# Patient Record
Sex: FEMALE | Race: WHITE | NOT HISPANIC OR LATINO
[De-identification: names, ages, dates, MRNs, and addresses within clinical notes are randomized per-mention and may not be internally consistent; named-entity substitution may affect disease eponyms.]

---

## 2017-09-19 ENCOUNTER — RX ONLY (OUTPATIENT)
Age: 42
Setting detail: RX ONLY
End: 2017-09-19

## 2019-01-30 ENCOUNTER — OFFICE VISIT (OUTPATIENT)
Dept: URGENT CARE | Facility: CLINIC | Age: 44
End: 2019-01-30
Payer: COMMERCIAL

## 2019-01-30 VITALS
HEIGHT: 66 IN | DIASTOLIC BLOOD PRESSURE: 70 MMHG | WEIGHT: 250 LBS | TEMPERATURE: 97.9 F | SYSTOLIC BLOOD PRESSURE: 110 MMHG | BODY MASS INDEX: 40.18 KG/M2

## 2019-01-30 DIAGNOSIS — R10.9 LEFT FLANK PAIN: ICD-10-CM

## 2019-01-30 LAB
APPEARANCE UR: CLEAR
BILIRUB UR STRIP-MCNC: NORMAL MG/DL
COLOR UR AUTO: NORMAL
GLUCOSE UR STRIP.AUTO-MCNC: NORMAL MG/DL
KETONES UR STRIP.AUTO-MCNC: NORMAL MG/DL
LEUKOCYTE ESTERASE UR QL STRIP.AUTO: NORMAL
NITRITE UR QL STRIP.AUTO: NORMAL
PH UR STRIP.AUTO: 6 [PH] (ref 5–8)
PROT UR QL STRIP: NORMAL MG/DL
RBC UR QL AUTO: NORMAL
SP GR UR STRIP.AUTO: 1.02
UROBILINOGEN UR STRIP-MCNC: 0.2 MG/DL

## 2019-01-30 PROCEDURE — 81002 URINALYSIS NONAUTO W/O SCOPE: CPT | Performed by: NURSE PRACTITIONER

## 2019-01-30 PROCEDURE — 99203 OFFICE O/P NEW LOW 30 MIN: CPT | Performed by: NURSE PRACTITIONER

## 2019-01-30 RX ORDER — APIXABAN 5 MG/1
5 TABLET, FILM COATED ORAL 2 TIMES DAILY
COMMUNITY
Start: 2019-01-28

## 2019-01-30 ASSESSMENT — ENCOUNTER SYMPTOMS
VOMITING: 0
NAUSEA: 0
ABDOMINAL PAIN: 0
MYALGIAS: 0
WEAKNESS: 0
SHORTNESS OF BREATH: 0
FEVER: 0
PARESIS: 0
EYE PAIN: 0
FLANK PAIN: 1
PARESTHESIAS: 0
CHILLS: 0
PERIANAL NUMBNESS: 0
SORE THROAT: 0
NUMBNESS: 0
BACK PAIN: 1
BOWEL INCONTINENCE: 0
DIZZINESS: 0

## 2019-01-30 NOTE — PROGRESS NOTES
Subjective:     Annmarie Barahona is a 43 y.o. female who presents for Back Pain (Couple days Left lower back pain .)  Patient is a 43-year-old female presents clinic today for evaluation of worsening left-sided low back pain.  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  Patient denies any history of kidney stones.  Patient is followed by urology.  Patient denies any dysuria, urinary frequency, urinary hesitancy.  She denies any lower abdominal pain.  She denies any nausea, vomiting,, fever, chills.  Patient denies any mechanism of injury to the low back.     Back Pain   This is a new problem. Episode onset: 2 days. The problem occurs constantly. The problem has been rapidly worsening since onset. Pain location: Left flank. The quality of the pain is described as aching. The pain does not radiate. The pain is at a severity of 10/10. The pain is severe. The pain is the same all the time. Exacerbated by: Palpation. Stiffness is present all day. Pertinent negatives include no abdominal pain, bladder incontinence, bowel incontinence, chest pain, dysuria, fever, numbness, paresis, paresthesias, pelvic pain, perianal numbness or weakness. Risk factors: Factor VIII deficiency, previous kidney thrombosis, nephrectomy. She has tried analgesics for the symptoms. The treatment provided no relief.     PMH:  has no past medical history on file.  MEDS:   Current Outpatient Prescriptions:   •  metFORMIN (GLUCOPHAGE) 500 MG Tab, Take 500 mg by mouth 2 times a day, with meals., Disp: , Rfl:   •  ELIQUIS 5 MG Tab, Take 5 mg by mouth 2 Times a Day., Disp: , Rfl:   •  sertraline (ZOLOFT) 50 MG Tab, Take 50 mg by mouth every day., Disp: , Rfl: 3  •  ORTHO-NOVUM 1/35 (28) PO, QDAY. , Disp: , Rfl:   ALLERGIES:   Allergies   Allergen Reactions   • Codeine    •  "Hydrocodone-Acetaminophen      SURGHX:   Past Surgical History:   Procedure Laterality Date   • NEPHRECTOMY LAPAROSCOPIC Right      SOCHX:  reports that she has never smoked. She has never used smokeless tobacco. She reports that she does not drink alcohol or use drugs.  FH: Family history was reviewed, no pertinent findings to report       Review of Systems   Constitutional: Negative for chills and fever.   HENT: Negative for sore throat.    Eyes: Negative for pain.   Respiratory: Negative for shortness of breath.    Cardiovascular: Negative for chest pain.   Gastrointestinal: Negative for abdominal pain, bowel incontinence, nausea and vomiting.   Genitourinary: Positive for flank pain. Negative for bladder incontinence, dysuria, frequency, hematuria, pelvic pain and urgency.   Musculoskeletal: Positive for back pain. Negative for myalgias.   Skin: Negative for rash.   Neurological: Negative for dizziness, weakness, numbness and paresthesias.     Allergies   Allergen Reactions   • Codeine    • Hydrocodone-Acetaminophen       Objective:   /70 (BP Location: Left arm, Patient Position: Sitting, BP Cuff Size: Adult)   Temp 36.6 °C (97.9 °F) (Temporal)   Ht 1.664 m (5' 5.5\")   Wt 113.4 kg (250 lb)   BMI 40.97 kg/m²   Physical Exam   Constitutional: She is oriented to person, place, and time. She appears well-developed and well-nourished. No distress.   HENT:   Head: Normocephalic and atraumatic.   Right Ear: Tympanic membrane normal.   Left Ear: Tympanic membrane normal.   Nose: Nose normal. Right sinus exhibits no maxillary sinus tenderness and no frontal sinus tenderness. Left sinus exhibits no maxillary sinus tenderness and no frontal sinus tenderness.   Mouth/Throat: Uvula is midline, oropharynx is clear and moist and mucous membranes are normal. No posterior oropharyngeal edema, posterior oropharyngeal erythema or tonsillar abscesses. No tonsillar exudate.   Eyes: Pupils are equal, round, and reactive to " light. Conjunctivae and EOM are normal. Right eye exhibits no discharge. Left eye exhibits no discharge.   Cardiovascular: Normal rate and regular rhythm.    No murmur heard.  Pulmonary/Chest: Effort normal and breath sounds normal. No respiratory distress.   Abdominal: Soft. Bowel sounds are normal. She exhibits no distension. There is no tenderness. There is CVA tenderness (Left side).   Musculoskeletal:        Lumbar back: She exhibits normal range of motion, no tenderness, no bony tenderness, no pain and no spasm.        Back:    Neurological: She is alert and oriented to person, place, and time. She has normal reflexes. No sensory deficit.   Skin: Skin is warm, dry and intact.   Psychiatric: She has a normal mood and affect.         Assessment/Plan:   Assessment    1. Left flank pain  POCT Urinalysis   Urinalysis positive; RBCs, ketones    Patient is a 43-year-old female who presents clinic today for evaluation of left sided low back pain.  It appears patient has severe left-sided CVA tenderness.  Patient has a significant history of nephrectomy of the right due to a thrombosis of the right kidney patient does have factor VIII deficiency for which she does take Eliquis.  Patient is in severe amount of pain visually uncomfortable in exam room.  Concerned that patient will need stat imaging and stat labs due to history and presentation.  Discussed differentials with patient patient opting to go to the emergency room for full evaluation.  Patient will go to [facility] at this time for complete evaluation.  Patient has been directed to [facility] ER for further management/work up, I have reiterated to patient that although a provider to provider transfer was made this will not necessarily expedite the ER process.

## 2025-01-13 ENCOUNTER — HOSPITAL ENCOUNTER (OUTPATIENT)
Dept: RADIOLOGY | Facility: MEDICAL CENTER | Age: 50
End: 2025-01-13

## 2025-02-05 ENCOUNTER — HOSPITAL ENCOUNTER (OUTPATIENT)
Facility: MEDICAL CENTER | Age: 50
End: 2025-02-05
Attending: SURGERY | Admitting: SURGERY
Payer: COMMERCIAL

## 2025-02-05 DIAGNOSIS — Z01.810 PRE-OPERATIVE CARDIOVASCULAR EXAMINATION: ICD-10-CM

## 2025-02-05 DIAGNOSIS — Z01.812 PRE-OPERATIVE LABORATORY EXAMINATION: ICD-10-CM

## 2025-02-07 ENCOUNTER — APPOINTMENT (OUTPATIENT)
Dept: ADMISSIONS | Facility: MEDICAL CENTER | Age: 50
End: 2025-02-07
Attending: SURGERY
Payer: COMMERCIAL

## 2025-02-13 ENCOUNTER — PRE-ADMISSION TESTING (OUTPATIENT)
Dept: ADMISSIONS | Facility: MEDICAL CENTER | Age: 50
End: 2025-02-13
Attending: SURGERY
Payer: COMMERCIAL

## 2025-02-13 RX ORDER — LEVONORGESTREL 52 MG/1
1 INTRAUTERINE DEVICE INTRAUTERINE ONCE
COMMUNITY

## 2025-02-13 NOTE — PREADMIT AVS NOTE
Current Medications   Medication Instructions    levonorgestrel (MIRENA, 52 MG,) 20 MCG/DAY IUD Continue as prescribed    ELIQUIS 5 MG Tab Follow instructions from surgeon or specialist.    sertraline (ZOLOFT) 50 MG Tab Continue taking medication as prescribed, including morning of procedure

## 2025-02-24 ENCOUNTER — APPOINTMENT (OUTPATIENT)
Dept: ADMISSIONS | Facility: MEDICAL CENTER | Age: 50
End: 2025-02-24
Attending: SURGERY
Payer: COMMERCIAL

## 2025-02-24 DIAGNOSIS — Z01.810 PRE-OPERATIVE CARDIOVASCULAR EXAMINATION: ICD-10-CM

## 2025-02-24 DIAGNOSIS — Z01.812 PRE-OPERATIVE LABORATORY EXAMINATION: ICD-10-CM

## 2025-02-24 LAB
ANION GAP SERPL CALC-SCNC: 11 MMOL/L (ref 7–16)
BUN SERPL-MCNC: 7 MG/DL (ref 8–22)
CALCIUM SERPL-MCNC: 8.8 MG/DL (ref 8.5–10.5)
CHLORIDE SERPL-SCNC: 105 MMOL/L (ref 96–112)
CO2 SERPL-SCNC: 24 MMOL/L (ref 20–33)
CREAT SERPL-MCNC: 0.92 MG/DL (ref 0.5–1.4)
EKG IMPRESSION: NORMAL
ERYTHROCYTE [DISTWIDTH] IN BLOOD BY AUTOMATED COUNT: 47.8 FL (ref 35.9–50)
GFR SERPLBLD CREATININE-BSD FMLA CKD-EPI: 76 ML/MIN/1.73 M 2
GLUCOSE SERPL-MCNC: 104 MG/DL (ref 65–99)
HCT VFR BLD AUTO: 46.2 % (ref 37–47)
HGB BLD-MCNC: 14.8 G/DL (ref 12–16)
MCH RBC QN AUTO: 28.7 PG (ref 27–33)
MCHC RBC AUTO-ENTMCNC: 32 G/DL (ref 32.2–35.5)
MCV RBC AUTO: 89.5 FL (ref 81.4–97.8)
PLATELET # BLD AUTO: 281 K/UL (ref 164–446)
PMV BLD AUTO: 11.3 FL (ref 9–12.9)
POTASSIUM SERPL-SCNC: 3.9 MMOL/L (ref 3.6–5.5)
RBC # BLD AUTO: 5.16 M/UL (ref 4.2–5.4)
SODIUM SERPL-SCNC: 140 MMOL/L (ref 135–145)
WBC # BLD AUTO: 9.5 K/UL (ref 4.8–10.8)

## 2025-02-24 PROCEDURE — 93005 ELECTROCARDIOGRAM TRACING: CPT | Mod: TC | Performed by: SURGERY

## 2025-02-24 PROCEDURE — 36415 COLL VENOUS BLD VENIPUNCTURE: CPT

## 2025-02-24 PROCEDURE — 93010 ELECTROCARDIOGRAM REPORT: CPT | Performed by: INTERNAL MEDICINE

## 2025-02-24 PROCEDURE — 80048 BASIC METABOLIC PNL TOTAL CA: CPT

## 2025-02-24 PROCEDURE — 85027 COMPLETE CBC AUTOMATED: CPT

## 2025-02-28 ENCOUNTER — ANESTHESIA EVENT (OUTPATIENT)
Dept: SURGERY | Facility: MEDICAL CENTER | Age: 50
End: 2025-02-28
Payer: COMMERCIAL

## 2025-03-03 ENCOUNTER — ANESTHESIA (OUTPATIENT)
Dept: SURGERY | Facility: MEDICAL CENTER | Age: 50
End: 2025-03-03
Payer: COMMERCIAL

## 2025-03-03 VITALS
WEIGHT: 271.17 LBS | SYSTOLIC BLOOD PRESSURE: 137 MMHG | OXYGEN SATURATION: 97 % | TEMPERATURE: 96.6 F | RESPIRATION RATE: 18 BRPM | BODY MASS INDEX: 45.18 KG/M2 | HEART RATE: 72 BPM | DIASTOLIC BLOOD PRESSURE: 60 MMHG | HEIGHT: 65 IN

## 2025-03-03 LAB — HCG UR QL: NEGATIVE

## 2025-03-03 PROCEDURE — 700101 HCHG RX REV CODE 250: Performed by: STUDENT IN AN ORGANIZED HEALTH CARE EDUCATION/TRAINING PROGRAM

## 2025-03-03 PROCEDURE — 160015 HCHG STAT PREOP MINUTES: Performed by: SURGERY

## 2025-03-03 PROCEDURE — 160041 HCHG SURGERY MINUTES - EA ADDL 1 MIN LEVEL 4: Performed by: SURGERY

## 2025-03-03 PROCEDURE — 700111 HCHG RX REV CODE 636 W/ 250 OVERRIDE (IP): Mod: JZ | Performed by: PHYSICIAN ASSISTANT

## 2025-03-03 PROCEDURE — 160048 HCHG OR STATISTICAL LEVEL 1-5: Performed by: SURGERY

## 2025-03-03 PROCEDURE — 160002 HCHG RECOVERY MINUTES (STAT): Performed by: SURGERY

## 2025-03-03 PROCEDURE — 700102 HCHG RX REV CODE 250 W/ 637 OVERRIDE(OP): Performed by: STUDENT IN AN ORGANIZED HEALTH CARE EDUCATION/TRAINING PROGRAM

## 2025-03-03 PROCEDURE — 81025 URINE PREGNANCY TEST: CPT

## 2025-03-03 PROCEDURE — 160035 HCHG PACU - 1ST 60 MINS PHASE I: Performed by: SURGERY

## 2025-03-03 PROCEDURE — 160046 HCHG PACU - 1ST 60 MINS PHASE II: Performed by: SURGERY

## 2025-03-03 PROCEDURE — 160009 HCHG ANES TIME/MIN: Performed by: SURGERY

## 2025-03-03 PROCEDURE — A9270 NON-COVERED ITEM OR SERVICE: HCPCS | Performed by: STUDENT IN AN ORGANIZED HEALTH CARE EDUCATION/TRAINING PROGRAM

## 2025-03-03 PROCEDURE — 160029 HCHG SURGERY MINUTES - 1ST 30 MINS LEVEL 4: Performed by: SURGERY

## 2025-03-03 PROCEDURE — 700105 HCHG RX REV CODE 258: Performed by: SURGERY

## 2025-03-03 PROCEDURE — 160025 RECOVERY II MINUTES (STATS): Performed by: SURGERY

## 2025-03-03 PROCEDURE — 700111 HCHG RX REV CODE 636 W/ 250 OVERRIDE (IP): Performed by: STUDENT IN AN ORGANIZED HEALTH CARE EDUCATION/TRAINING PROGRAM

## 2025-03-03 PROCEDURE — 700101 HCHG RX REV CODE 250: Performed by: SURGERY

## 2025-03-03 RX ORDER — DIPHENHYDRAMINE HYDROCHLORIDE 50 MG/ML
12.5 INJECTION, SOLUTION INTRAMUSCULAR; INTRAVENOUS
Status: DISCONTINUED | OUTPATIENT
Start: 2025-03-03 | End: 2025-03-03 | Stop reason: HOSPADM

## 2025-03-03 RX ORDER — CELECOXIB 200 MG/1
200 CAPSULE ORAL ONCE
Status: COMPLETED | OUTPATIENT
Start: 2025-03-03 | End: 2025-03-03

## 2025-03-03 RX ORDER — BUPIVACAINE HYDROCHLORIDE AND EPINEPHRINE 5; 5 MG/ML; UG/ML
INJECTION, SOLUTION PERINEURAL
Status: DISCONTINUED | OUTPATIENT
Start: 2025-03-03 | End: 2025-03-03 | Stop reason: HOSPADM

## 2025-03-03 RX ORDER — ROCURONIUM BROMIDE 10 MG/ML
INJECTION, SOLUTION INTRAVENOUS PRN
Status: DISCONTINUED | OUTPATIENT
Start: 2025-03-03 | End: 2025-03-03 | Stop reason: SURG

## 2025-03-03 RX ORDER — METHOCARBAMOL 100 MG/ML
1000 INJECTION, SOLUTION INTRAMUSCULAR; INTRAVENOUS EVERY 8 HOURS PRN
Status: DISCONTINUED | OUTPATIENT
Start: 2025-03-03 | End: 2025-03-03 | Stop reason: HOSPADM

## 2025-03-03 RX ORDER — SODIUM CHLORIDE, SODIUM LACTATE, POTASSIUM CHLORIDE, CALCIUM CHLORIDE 600; 310; 30; 20 MG/100ML; MG/100ML; MG/100ML; MG/100ML
INJECTION, SOLUTION INTRAVENOUS CONTINUOUS
Status: DISCONTINUED | OUTPATIENT
Start: 2025-03-03 | End: 2025-03-03 | Stop reason: HOSPADM

## 2025-03-03 RX ORDER — ONDANSETRON 2 MG/ML
4 INJECTION INTRAMUSCULAR; INTRAVENOUS
Status: DISCONTINUED | OUTPATIENT
Start: 2025-03-03 | End: 2025-03-03 | Stop reason: HOSPADM

## 2025-03-03 RX ORDER — LIDOCAINE HYDROCHLORIDE 40 MG/ML
SOLUTION TOPICAL PRN
Status: DISCONTINUED | OUTPATIENT
Start: 2025-03-03 | End: 2025-03-03 | Stop reason: SURG

## 2025-03-03 RX ORDER — ALBUTEROL SULFATE 5 MG/ML
2.5 SOLUTION RESPIRATORY (INHALATION)
Status: DISCONTINUED | OUTPATIENT
Start: 2025-03-03 | End: 2025-03-03 | Stop reason: HOSPADM

## 2025-03-03 RX ORDER — ACETAMINOPHEN 500 MG
1000 TABLET ORAL EVERY 6 HOURS PRN
COMMUNITY

## 2025-03-03 RX ORDER — HYDRALAZINE HYDROCHLORIDE 20 MG/ML
5 INJECTION INTRAMUSCULAR; INTRAVENOUS
Status: DISCONTINUED | OUTPATIENT
Start: 2025-03-03 | End: 2025-03-03 | Stop reason: HOSPADM

## 2025-03-03 RX ORDER — DEXAMETHASONE SODIUM PHOSPHATE 4 MG/ML
INJECTION, SOLUTION INTRA-ARTICULAR; INTRALESIONAL; INTRAMUSCULAR; INTRAVENOUS; SOFT TISSUE PRN
Status: DISCONTINUED | OUTPATIENT
Start: 2025-03-03 | End: 2025-03-03 | Stop reason: SURG

## 2025-03-03 RX ORDER — CEFAZOLIN SODIUM 1 G/3ML
INJECTION, POWDER, FOR SOLUTION INTRAMUSCULAR; INTRAVENOUS PRN
Status: DISCONTINUED | OUTPATIENT
Start: 2025-03-03 | End: 2025-03-03 | Stop reason: SURG

## 2025-03-03 RX ORDER — KETOROLAC TROMETHAMINE 15 MG/ML
15 INJECTION, SOLUTION INTRAMUSCULAR; INTRAVENOUS EVERY 6 HOURS PRN
Status: DISCONTINUED | OUTPATIENT
Start: 2025-03-03 | End: 2025-03-03 | Stop reason: HOSPADM

## 2025-03-03 RX ORDER — ONDANSETRON 2 MG/ML
INJECTION INTRAMUSCULAR; INTRAVENOUS PRN
Status: DISCONTINUED | OUTPATIENT
Start: 2025-03-03 | End: 2025-03-03 | Stop reason: SURG

## 2025-03-03 RX ORDER — OXYCODONE HCL 5 MG/5 ML
10 SOLUTION, ORAL ORAL
Status: DISCONTINUED | OUTPATIENT
Start: 2025-03-03 | End: 2025-03-03 | Stop reason: HOSPADM

## 2025-03-03 RX ORDER — SCOPOLAMINE 1 MG/3D
1 PATCH, EXTENDED RELEASE TRANSDERMAL
Status: DISCONTINUED | OUTPATIENT
Start: 2025-03-03 | End: 2025-03-03 | Stop reason: HOSPADM

## 2025-03-03 RX ORDER — EPHEDRINE SULFATE 50 MG/ML
5 INJECTION, SOLUTION INTRAVENOUS
Status: DISCONTINUED | OUTPATIENT
Start: 2025-03-03 | End: 2025-03-03 | Stop reason: HOSPADM

## 2025-03-03 RX ORDER — OXYCODONE HCL 5 MG/5 ML
5 SOLUTION, ORAL ORAL
Status: DISCONTINUED | OUTPATIENT
Start: 2025-03-03 | End: 2025-03-03 | Stop reason: HOSPADM

## 2025-03-03 RX ORDER — FAMOTIDINE 40 MG/1
40 TABLET, FILM COATED ORAL EVERY EVENING
COMMUNITY

## 2025-03-03 RX ORDER — HALOPERIDOL 5 MG/ML
1 INJECTION INTRAMUSCULAR
Status: DISCONTINUED | OUTPATIENT
Start: 2025-03-03 | End: 2025-03-03 | Stop reason: HOSPADM

## 2025-03-03 RX ADMIN — PROPOFOL 200 MG: 10 INJECTION, EMULSION INTRAVENOUS at 07:36

## 2025-03-03 RX ADMIN — ONDANSETRON 4 MG: 2 INJECTION INTRAMUSCULAR; INTRAVENOUS at 08:04

## 2025-03-03 RX ADMIN — CEFAZOLIN 3 G: 1 INJECTION, POWDER, FOR SOLUTION INTRAMUSCULAR; INTRAVENOUS at 07:39

## 2025-03-03 RX ADMIN — FENTANYL CITRATE 50 MCG: 50 INJECTION, SOLUTION INTRAMUSCULAR; INTRAVENOUS at 07:36

## 2025-03-03 RX ADMIN — ROCURONIUM BROMIDE 80 MG: 10 INJECTION INTRAVENOUS at 07:37

## 2025-03-03 RX ADMIN — SCOPOLAMINE 1 PATCH: 1.5 PATCH, EXTENDED RELEASE TRANSDERMAL at 06:57

## 2025-03-03 RX ADMIN — SODIUM CHLORIDE, POTASSIUM CHLORIDE, SODIUM LACTATE AND CALCIUM CHLORIDE: 600; 310; 30; 20 INJECTION, SOLUTION INTRAVENOUS at 06:56

## 2025-03-03 RX ADMIN — PROPOFOL 50 MG: 10 INJECTION, EMULSION INTRAVENOUS at 08:10

## 2025-03-03 RX ADMIN — KETOROLAC TROMETHAMINE 15 MG: 15 INJECTION, SOLUTION INTRAMUSCULAR; INTRAVENOUS at 09:26

## 2025-03-03 RX ADMIN — CELECOXIB 200 MG: 200 CAPSULE ORAL at 06:56

## 2025-03-03 RX ADMIN — LIDOCAINE HYDROCHLORIDE 4 ML: 40 SOLUTION TOPICAL at 07:37

## 2025-03-03 RX ADMIN — SUGAMMADEX 200 MG: 100 INJECTION, SOLUTION INTRAVENOUS at 08:10

## 2025-03-03 RX ADMIN — DEXAMETHASONE SODIUM PHOSPHATE 8 MG: 4 INJECTION INTRA-ARTICULAR; INTRALESIONAL; INTRAMUSCULAR; INTRAVENOUS; SOFT TISSUE at 07:39

## 2025-03-03 ASSESSMENT — PAIN DESCRIPTION - PAIN TYPE: TYPE: SURGICAL PAIN

## 2025-03-03 ASSESSMENT — PAIN SCALES - GENERAL: PAIN_LEVEL: 1

## 2025-03-03 NOTE — ANESTHESIA TIME REPORT
Anesthesia Start and Stop Event Times       Date Time Event    3/3/2025 0729 Anesthesia Start     0827 Anesthesia Stop          Responsible Staff  03/03/25      Name Role Begin End    Clarisa Martinez M.D. Anesth 0729 0827          Overtime Reason:  no overtime (within assigned shift)    Comments:

## 2025-03-03 NOTE — DISCHARGE INSTRUCTIONS
HOME CARE INSTRUCTIONS    ACTIVITY: Rest and take it easy for the first 24 hours.  A responsible adult is recommended to remain with you during that time.  It is normal to feel sleepy.  We encourage you to not do anything that requires balance, judgment or coordination.    FOR 24 HOURS DO NOT:  Drive, operate machinery or run household appliances.  Drink beer or alcoholic beverages.  Make important decisions or sign legal documents.    SPECIAL INSTRUCTIONS:   1. Discharge home when alert, comfortable, ambulatory, and tolerating PO well.  2. Pt counseled re: diet, activity, home med's, and wound care.  3. ADAT to full liquids. Continue full liquids until bowel function returns. Then, ok to ADAT soft foods. Continue soft foods until postop appt.   4. May shower over Tegaderms tomorrow.   5. Ok to remove Tegaderms in four days. May continue to shower once bandages removed, but no baths/soaks x 2 weeks.  6. No driving for 4-5 days.  7. No lifting >15 lbs for 2-3 weeks.  8. Resume eliquis tomorrow.  9. Follow-up with Dr. Ganser in 1-2 weeks.    CALL IF YOU:     (1) fevers greater than 101.0 degrees F.    (2) Unusual chest or leg pain, redness or swelling behind the knee or in the calf muscle.    (3) Drainage or fluid from incision that may be foul smelling, increased tenderness or soreness at the wound or the wound edges are no longer together, redness or swelling at the incision site.    (4) Severe and/or progressively worsening shortness of breath.    (5) Please do not hesitate to call with any other questions. ([phone]).    DIET: To avoid nausea, slowly advance diet as tolerated, avoiding spicy or greasy foods for the first day.  Add more substantial food to your diet according to your physician's instructions.  Babies can be fed formula or breast milk as soon as they are hungry.  INCREASE FLUIDS AND FIBER TO AVOID CONSTIPATION.    SURGICAL DRESSING/BATHING: See above.    MEDICATIONS: Resume taking daily  medication.  Take prescribed pain medication with food.  If no medication is prescribed, you may take non-aspirin pain medication if needed.  PAIN MEDICATION CAN BE VERY CONSTIPATING.  Take a stool softener or laxative such as senokot, pericolace, or milk of magnesia if needed.    Last  medication given at 0656 Scopolamine patch.     A follow-up appointment should be arranged with your doctor in 1-2 weeks; call to schedule.    You should CALL YOUR PHYSICIAN if you develop:  Fever greater than 101 degrees F.  Pain not relieved by medication, or persistent nausea or vomiting.  Excessive bleeding (blood soaking through dressing) or unexpected drainage from the wound.  Extreme redness or swelling around the incision site, drainage of pus or foul smelling drainage.  Inability to urinate or empty your bladder within 8 hours.  Problems with breathing or chest pain.    You should call 911 if you develop problems with breathing or chest pain.  If you are unable to contact your doctor or surgical center, you should go to the nearest emergency room or urgent care center.  Physician's telephone #: Dr. Ganser [phone]    MILD FLU-LIKE SYMPTOMS ARE NORMAL.  YOU MAY EXPERIENCE GENERALIZED MUSCLE ACHES, THROAT IRRITATION, HEADACHE AND/OR SOME NAUSEA.    If any questions arise, call your doctor.  If your doctor is not available, please feel free to call the Surgical Center at [phone].  The Center is open Monday through Friday from 7AM to 7PM.      A registered nurse may call you a few days after your surgery to see how you are doing after your procedure.    You may also receive a survey in the mail within the next two weeks and we ask that you take a few moments to complete the survey and return it to us.  Our goal is to provide you with very good care and we value your comments.     Depression / Suicide Risk    As you are discharged from this [facility] facility, it is important to learn how to keep safe from harming  yourself.    Recognize the warning signs:  Abrupt changes in personality, positive or negative- including increase in energy   Giving away possessions  Change in eating patterns- significant weight changes-  positive or negative  Change in sleeping patterns- unable to sleep or sleeping all the time   Unwillingness or inability to communicate  Depression  Unusual sadness, discouragement and loneliness  Talk of wanting to die  Neglect of personal appearance   Rebelliousness- reckless behavior  Withdrawal from people/activities they love  Confusion- inability to concentrate     If you or a loved one observes any of these behaviors or has concerns about self-harm, here's what you can do:  Talk about it- your feelings and reasons for harming yourself  Remove any means that you might use to hurt yourself (examples: pills, rope, extension cords, firearm)  Get professional help from the community (Mental Health, Substance Abuse, psychological counseling)  Do not be alone:Call your Safe Contact- someone whom you trust who will be there for you.  Call your local CRISIS HOTLINE 848-8108 or 604-160-9756  Call your local Children's Mobile Crisis Response Team [place] (954) 993-4906 or www.[employer]  Call the toll free National Suicide Prevention Hotlines   National Suicide Prevention Lifeline 905-170-TCTF (7514)  National Hope Line Network 800-SUICIDE (929-4357)    I acknowledge receipt and understanding of these Home Care instructions.

## 2025-03-03 NOTE — ANESTHESIA PROCEDURE NOTES
Airway    Date/Time: 3/3/2025 7:37 AM    Performed by: Clarisa Martinez M.D.  Authorized by: Clarisa Martinez M.D.    Location:  OR  Urgency:  Elective  Indications for Airway Management:  Anesthesia      Spontaneous Ventilation: absent    Sedation Level:  Deep  Preoxygenated: Yes    Patient Position:  Sniffing  Mask Difficulty Assessment:  1 - vent by mask  Final Airway Type:  Endotracheal airway  Final Endotracheal Airway:  ETT  Cuffed: Yes    Technique Used for Successful ETT Placement:  Direct laryngoscopy  Devices/Methods Used in Placement:  Intubating stylet    Insertion Site:  Oral  Blade Type:  Shira  Laryngoscope Blade/Videolaryngoscope Blade Size:  3  ETT Size (mm):  7.0  Measured from:  Teeth  ETT to Teeth (cm):  21  Placement Verified by: auscultation and capnometry    Cormack-Lehane Classification:  Grade I - full view of glottis  Number of Attempts at Approach:  1

## 2025-03-03 NOTE — ANESTHESIA POSTPROCEDURE EVALUATION
Patient: Annmarie Barahona    Procedure Summary       Date: 03/03/25 Room / Location: [place] [address] / SURGERY [facility]    Anesthesia Start: 0729 Anesthesia Stop: 0827    Procedure: LAPAROSCOPIC REMOVAL OF GASTRIC BAND AND PORT (Abdomen) Diagnosis: (HISTORY OF LAPAROSCOPIC ADJUSTABLE GASTRIC BANDING)    Surgeons: John H Ganser, M.D. Responsible Provider: Clarisa Martinez M.D.    Anesthesia Type: general, peripheral nerve block ASA Status: 3            Final Anesthesia Type: general, peripheral nerve block  Last vitals  BP   Blood Pressure: 114/86    Temp   36.8 °C (98.2 °F)    Pulse   66   Resp   16    SpO2   98 %      Anesthesia Post Evaluation    Patient location during evaluation: PACU  Patient participation: complete - patient participated  Level of consciousness: sleepy but conscious  Pain score: 1    Airway patency: patent  Anesthetic complications: no  Cardiovascular status: hemodynamically stable  Respiratory status: acceptable and face mask  Hydration status: euvolemic    PONV: none          No notable events documented.     Nurse Pain Score: 0 (NPRS)

## 2025-03-03 NOTE — OR NURSING
Pt arrived to Phase II. Patient's VSS. Dressing CDI.  Pt verbalizing request to go home. IV removed. Ice pack given to patient. Discharge instructions reviewed with patient. All questions answered. All belongings returned to patient and prescriptions given.

## 2025-03-03 NOTE — OP REPORT
Operative Report    Date: 3/3/2025    Surgeon: John Ganser M.D.    Assistant: El Dimas PA-C    Anesthesia: Dr. Clarisa Martinez    Preoperative Diagnosis: Morbid Obesity, dysphagia, regurgitation and nausea secondary to Lap Band    Postoperative Diagnosis: Same    Procedure Performed: Laparoscopic Removal Lap Band and port    Indications: The patient is suffering from morbid obesity and it's sequelae with a body mass index of 45 kg/m2. They have had a Lap Band and complications related requiring band removal. Risks and alternatives discussed and questions answered.      DESCRIPTION OF PROCEDURE: Patient was identified and general anesthetic   administered.  Her abdomen was prepped and draped in usual sterile fashion.    Local anesthesia of 0.5% Marcaine with epinephrine was injected prior to   making skin incision.  A small incision was made to left midline in the   epigastric region and the Veress needle passed.  The abdomen was insufflated   with carbon dioxide without incident and a 5-mm blunt trocar and 5 mm   30-degree scope inserted.  Carolyn liver retractor was passed through small   subxiphoid incision, used to elevate the lateral segment of liver. Right upper quadrant 12 mm and left upper quadrant 15 and 5 mm trocars were placed. The scarring around the band was divided with cautery and the capsule on top of the band divided with cautery and the band cut and removed from around the stomach as it was a 10 cm band.  Tubing was cut at the fascia and the band withdrawn through the 15 mm trocar.  Scar capsule on the stomach itself was then able to be easily removed with sparing use of cautery avoiding injury to the stomach. Hemostasis was assured.    Additional local anesthesia was injected into the left lateral abdominal wall.     Trocars were withdrawn and the port removed through the 15 mm trocar site from   the subcutaneous location and the scar capsule removed as well.  Hemostasis   was assured.  The  incisions were closed with Vicryl.  Sterile dressings were   applied.  The patient returned to recovery room in stable condition.     The indications for a surgical assistant in this surgery were indicated due to complexity of the procedure. Their role included aiding in incision, retraction, holding devices including camera for laparoscopic procedure, and closure of the wound.        ____________________________________  JOHN H. GANSER, MD John Ganser, M.D., F.A.C.S.  [facility]  [facility]  [phone]

## 2025-03-03 NOTE — ANESTHESIA PREPROCEDURE EVALUATION
Case: [id number] Date/Time: 03/03/25 0715    Procedure: LAPAROSCOPIC REMOVAL OF GASTRIC BAND AND PORT (Abdomen)    Anesthesia type: General    Pre-op diagnosis: HISTORY OF LAPAROSCOPIC ADJUSTABLE GASTRIC BANDING    Location: [facility] OR 14 / SURGERY [facility]    Surgeons: John H Ganser, M.D.            Relevant Problems   No relevant active problems       Physical Exam    Airway   Mallampati: II  TM distance: >3 FB  Neck ROM: full       Cardiovascular - normal exam  Rhythm: regular  Rate: normal  (-) murmur     Dental - normal exam           Pulmonary - normal exam  Breath sounds clear to auscultation     Abdominal    Neurological - normal exam                   Anesthesia Plan    ASA 3   ASA physical status 3 criteria: morbid obesity - BMI greater than or equal to 40    Plan - general and peripheral nerve block     Peripheral nerve block will be post-op pain control  Airway plan will be ETT          Induction: intravenous    Postoperative Plan: Postoperative administration of opioids is intended.    Pertinent diagnostic labs and testing reviewed    Informed Consent:    Anesthetic plan and risks discussed with patient.    Use of blood products discussed with: patient whom consented to blood products.

## 2025-03-03 NOTE — PROGRESS NOTES
Medication history reviewed with PT at [facility] is complete per PT reporting    Allergies reviewed.     Patient denies any outpatient antibiotics in the last 30 days.     Patient is taking Eliquis 5mg. Last dose was 02/28/2025 at 2000.    Preferred pharmacy for this visit - [facility] [address] in [place] ([phone])

## (undated) DEVICE — DRESSING TRANSPARENT FILM TEGADERM 2.375 X 2.75" (100EA/BX)"

## (undated) DEVICE — CANNULA W/SEAL 5X100 Z-THRE - ADED KII (12/BX)

## (undated) DEVICE — TOWELS CLOTH SURGICAL - (4/PK 20PK/CA)

## (undated) DEVICE — SUTURE 3-0 VICRYL PLUS SH - 27 INCH (36/BX)

## (undated) DEVICE — PACK LAP CHOLE OR - (2EA/CA)

## (undated) DEVICE — CANISTER SUCTION 3000ML MECHANICAL FILTER AUTO SHUTOFF MEDI-VAC NONSTERILE LF DISP (40EA/CA)

## (undated) DEVICE — GOWN WARMING X-LARGE FLEX - (20/CA)

## (undated) DEVICE — SET EXTENSION WITH 2 PORTS (48EA/CA) ***PART #2C8610 IS A SUBSTITUTE*****

## (undated) DEVICE — SENSOR OXIMETER ADULT SPO2 RD SET (20EA/BX)

## (undated) DEVICE — GLOVE BIOGEL M SZ 8 SURGICAL PF LTX - (50/BX 4BX/CA)

## (undated) DEVICE — SUTURE GENERAL

## (undated) DEVICE — COVER LIGHT HANDLE ALC PLUS DISP (18EA/BX)

## (undated) DEVICE — TROCAR SEPARATOR 15MMZTHREAD - (6/BX)

## (undated) DEVICE — TUBING CLEARLINK DUO-VENT - C-FLO (48EA/CA)

## (undated) DEVICE — TROCAR 5X100 NON BLADED Z-TH - READ KII (6/BX)

## (undated) DEVICE — LACTATED RINGERS INJ 1000 ML - (14EA/CA 60CA/PF)

## (undated) DEVICE — ELECTRODE 5MM LHK LAPSCP STERILE DISP- MEGADYNE (5/CA)

## (undated) DEVICE — SLEEVE, VASO, REPROC, LARGE

## (undated) DEVICE — SUTURE 4-0 VICRYL PLUS FS-2 - 27 INCH (36/BX)

## (undated) DEVICE — CHLORAPREP 26 ML APPLICATOR - ORANGE TINT(25/CA)

## (undated) DEVICE — ENDOSTITCH10MM SUTURING DEVIC - (3/CA)

## (undated) DEVICE — SPONGE GAUZESTER. 2X2 4-PL - (2/PK 50PK/BX 30BX/CS)

## (undated) DEVICE — SYRINGE 20 ML LS (48/BX 4BX/CA)

## (undated) DEVICE — SUCTION INSTRUMENT YANKAUER BULBOUS TIP W/O VENT (50EA/CA)

## (undated) DEVICE — ENDOSTITCH LOAD UNIT 0 SURGI - 12/CA

## (undated) DEVICE — SET TUBING PNEUMOCLEAR HIGH FLOW SMOKE EVACUATION (10EA/BX)

## (undated) DEVICE — BAG SPONGE COUNT 10.25 X 32 - BLUE (250/CA)

## (undated) DEVICE — ELECTRODE DUAL RETURN W/ CORD - (50/PK)

## (undated) DEVICE — SODIUM CHL IRRIGATION 0.9% 1000ML (12EA/CA)

## (undated) DEVICE — SET LEADWIRE 5 LEAD BEDSIDE DISPOSABLE ECG (1SET OF 5/EA)

## (undated) DEVICE — NEEDLE INSFL 120MM 14GA VRRS - (20/BX)